# Patient Record
Sex: FEMALE | Race: BLACK OR AFRICAN AMERICAN | Employment: UNEMPLOYED | URBAN - METROPOLITAN AREA
[De-identification: names, ages, dates, MRNs, and addresses within clinical notes are randomized per-mention and may not be internally consistent; named-entity substitution may affect disease eponyms.]

---

## 2022-05-09 ENCOUNTER — HOSPITAL ENCOUNTER (EMERGENCY)
Age: 27
Discharge: HOME | End: 2022-05-09
Attending: STUDENT IN AN ORGANIZED HEALTH CARE EDUCATION/TRAINING PROGRAM

## 2022-05-09 ENCOUNTER — APPOINTMENT (OUTPATIENT)
Dept: ULTRASOUND IMAGING | Age: 27
End: 2022-05-09

## 2022-05-09 ENCOUNTER — APPOINTMENT (OUTPATIENT)
Dept: CT IMAGING | Age: 27
End: 2022-05-09

## 2022-05-09 VITALS
SYSTOLIC BLOOD PRESSURE: 128 MMHG | TEMPERATURE: 98 F | OXYGEN SATURATION: 100 % | RESPIRATION RATE: 18 BRPM | DIASTOLIC BLOOD PRESSURE: 70 MMHG | HEART RATE: 82 BPM

## 2022-05-09 DIAGNOSIS — R93.89 ENDOMETRIAL THICKENING ON ULTRASOUND: ICD-10-CM

## 2022-05-09 DIAGNOSIS — E28.2 POLYCYSTIC OVARIAN SYNDROME: Primary | ICD-10-CM

## 2022-05-09 LAB
ALBUMIN SERPL-MCNC: 4.4 GM/DL (ref 3.4–5)
ALP BLD-CCNC: 64 IU/L (ref 40–129)
ALT SERPL-CCNC: 27 U/L (ref 10–40)
ANION GAP SERPL CALCULATED.3IONS-SCNC: 11 MMOL/L (ref 4–16)
AST SERPL-CCNC: 21 IU/L (ref 15–37)
BACTERIA: NEGATIVE /HPF
BASOPHILS ABSOLUTE: 0.1 K/CU MM
BASOPHILS RELATIVE PERCENT: 0.7 % (ref 0–1)
BILIRUB SERPL-MCNC: 0.4 MG/DL (ref 0–1)
BILIRUBIN URINE: NEGATIVE MG/DL
BLOOD, URINE: NEGATIVE
BUN BLDV-MCNC: 8 MG/DL (ref 6–23)
CALCIUM IONIZED: 4.72 MG/DL (ref 4.48–5.28)
CALCIUM SERPL-MCNC: 9.4 MG/DL (ref 8.3–10.6)
CHLORIDE BLD-SCNC: 102 MMOL/L (ref 99–110)
CLARITY: ABNORMAL
CO2: 26 MMOL/L (ref 21–32)
COLOR: YELLOW
CREAT SERPL-MCNC: 0.6 MG/DL (ref 0.6–1.1)
DIFFERENTIAL TYPE: ABNORMAL
EOSINOPHILS ABSOLUTE: 0.1 K/CU MM
EOSINOPHILS RELATIVE PERCENT: 0.8 % (ref 0–3)
GFR AFRICAN AMERICAN: >60 ML/MIN/1.73M2
GFR NON-AFRICAN AMERICAN: >60 ML/MIN/1.73M2
GLUCOSE BLD-MCNC: 86 MG/DL (ref 70–99)
GLUCOSE, URINE: NEGATIVE MG/DL
HCT VFR BLD CALC: 44.2 % (ref 37–47)
HEMOGLOBIN: 14.9 GM/DL (ref 12.5–16)
IMMATURE NEUTROPHIL %: 0.3 % (ref 0–0.43)
INTERPRETATION: NORMAL
IONIZED CA: 1.18 MMOL/L (ref 1.12–1.32)
KETONES, URINE: NEGATIVE MG/DL
LEUKOCYTE ESTERASE, URINE: NEGATIVE
LIPASE: 43 IU/L (ref 13–60)
LYMPHOCYTES ABSOLUTE: 2 K/CU MM
LYMPHOCYTES RELATIVE PERCENT: 27.9 % (ref 24–44)
MCH RBC QN AUTO: 27.4 PG (ref 27–31)
MCHC RBC AUTO-ENTMCNC: 33.7 % (ref 32–36)
MCV RBC AUTO: 81.3 FL (ref 78–100)
MONOCYTES ABSOLUTE: 0.4 K/CU MM
MONOCYTES RELATIVE PERCENT: 5.8 % (ref 0–4)
MUCUS: ABNORMAL HPF
NITRITE URINE, QUANTITATIVE: NEGATIVE
NUCLEATED RBC %: 0 %
PDW BLD-RTO: 13.4 % (ref 11.7–14.9)
PH, URINE: 7.5 (ref 5–8)
PLATELET # BLD: 314 K/CU MM (ref 140–440)
PMV BLD AUTO: 12.5 FL (ref 7.5–11.1)
POTASSIUM SERPL-SCNC: 3.8 MMOL/L (ref 3.5–5.1)
PREGNANCY, URINE: NEGATIVE
PROTEIN UA: NEGATIVE MG/DL
RBC # BLD: 5.44 M/CU MM (ref 4.2–5.4)
RBC URINE: 1 /HPF (ref 0–6)
REASON FOR REJECTION: NORMAL
REJECTED TEST: NORMAL
SEGMENTED NEUTROPHILS ABSOLUTE COUNT: 4.6 K/CU MM
SEGMENTED NEUTROPHILS RELATIVE PERCENT: 64.5 % (ref 36–66)
SODIUM BLD-SCNC: 139 MMOL/L (ref 135–145)
SPECIFIC GRAVITY UA: 1.02 (ref 1–1.03)
SPECIFIC GRAVITY, URINE: 1.02 (ref 1–1.03)
SQUAMOUS EPITHELIAL: 10 /HPF
TOTAL IMMATURE NEUTOROPHIL: 0.02 K/CU MM
TOTAL NUCLEATED RBC: 0 K/CU MM
TOTAL PROTEIN: 7.9 GM/DL (ref 6.4–8.2)
TRICHOMONAS: ABNORMAL /HPF
UROBILINOGEN, URINE: 7.5 MG/DL (ref 0.2–1)
WBC # BLD: 7.2 K/CU MM (ref 4–10.5)
WBC UA: 1 /HPF (ref 0–5)

## 2022-05-09 PROCEDURE — 74177 CT ABD & PELVIS W/CONTRAST: CPT

## 2022-05-09 PROCEDURE — 76830 TRANSVAGINAL US NON-OB: CPT

## 2022-05-09 PROCEDURE — 81001 URINALYSIS AUTO W/SCOPE: CPT

## 2022-05-09 PROCEDURE — 99285 EMERGENCY DEPT VISIT HI MDM: CPT

## 2022-05-09 PROCEDURE — 82330 ASSAY OF CALCIUM: CPT

## 2022-05-09 PROCEDURE — 83690 ASSAY OF LIPASE: CPT

## 2022-05-09 PROCEDURE — 93975 VASCULAR STUDY: CPT

## 2022-05-09 PROCEDURE — 6360000002 HC RX W HCPCS: Performed by: STUDENT IN AN ORGANIZED HEALTH CARE EDUCATION/TRAINING PROGRAM

## 2022-05-09 PROCEDURE — 80053 COMPREHEN METABOLIC PANEL: CPT

## 2022-05-09 PROCEDURE — 81025 URINE PREGNANCY TEST: CPT

## 2022-05-09 PROCEDURE — 85025 COMPLETE CBC W/AUTO DIFF WBC: CPT

## 2022-05-09 PROCEDURE — 96374 THER/PROPH/DIAG INJ IV PUSH: CPT

## 2022-05-09 PROCEDURE — 6360000004 HC RX CONTRAST MEDICATION: Performed by: STUDENT IN AN ORGANIZED HEALTH CARE EDUCATION/TRAINING PROGRAM

## 2022-05-09 RX ORDER — KETOROLAC TROMETHAMINE 30 MG/ML
15 INJECTION, SOLUTION INTRAMUSCULAR; INTRAVENOUS ONCE
Status: COMPLETED | OUTPATIENT
Start: 2022-05-09 | End: 2022-05-09

## 2022-05-09 RX ADMIN — KETOROLAC TROMETHAMINE 15 MG: 30 INJECTION, SOLUTION INTRAMUSCULAR; INTRAVENOUS at 14:13

## 2022-05-09 RX ADMIN — IOPAMIDOL 75 ML: 755 INJECTION, SOLUTION INTRAVENOUS at 18:12

## 2022-05-09 NOTE — ED PROVIDER NOTES
Emergency Department Encounter    Patient: Diogo Moon  MRN: [id number]  : 1995  Date of Evaluation: 2022  ED Provider:  Shekhar Love MD    Triage Chief Complaint:   Abdominal Pain (lower abd pain. had miscarriage 3 months ago)    [place]:  Diogo Moon is a 33 y.o. female presenting with left lower quadrant abdominal discomfort. Patient states this has been going on since February. States over the past month left lower quadrant abdominal discomfort has become more constant and with worsening during periods. States her letters. Was the first of this month. Denies any current vaginal bleeding or discharge. States the pain currently is moderate, constant, stabbing, worse with palpation without relieving factors. Denies nausea vomiting, diarrhea constipation, blood or melena stools. Denies recent falls or trauma. Denies alcohol or drug use. Denies fevers or chills. Denies headache, blurred vision, focal neurodeficits, motor or sensory changes. Denies chest pain, shortness of breath, cough or sputum production. States she does not have a primary doctor and OB/GYN. ROS - see HPI, below listed is current ROS at time of my eval:  At least 14 systems reviewed, negative other HPI    No past medical history on file. No past surgical history on file. No family history on file.   Social History     Socioeconomic History    Marital status: Single     Spouse name: Not on file    Number of children: Not on file    Years of education: Not on file    Highest education level: Not on file   Occupational History    Not on file   Tobacco Use    Smoking status: Never Smoker    Smokeless tobacco: Not on file   Substance and Sexual Activity    Alcohol use: Never    Drug use: Never    Sexual activity: Not on file   Other Topics Concern    Not on file   Social History Narrative    Not on file     Social Determinants of Health     Financial Resource Strain:     Difficulty of Paying Living Expenses: Not on file   Food Insecurity:     Worried About Running Out of Food in the Last Year: Not on file    Ran Out of Food in the Last Year: Not on file   Transportation Needs:     Lack of Transportation (Medical): Not on file    Lack of Transportation (Non-Medical): Not on file   Physical Activity:     Days of Exercise per Week: Not on file    Minutes of Exercise per Session: Not on file   Stress:     Feeling of Stress : Not on file   Social Connections:     Frequency of Communication with Friends and Family: Not on file    Frequency of Social Gatherings with Friends and Family: Not on file    Attends Sabianism Services: Not on file    Active Member of [address] [place] [address] [employer] or Organizations: Not on file    Attends Club or Organization Meetings: Not on file    Marital Status: Not on file   Intimate Partner Violence:     Fear of Current or Ex-Partner: Not on file    Emotionally Abused: Not on file    Physically Abused: Not on file    Sexually Abused: Not on file   Housing Stability:     Unable to Pay for Housing in the Last Year: Not on file    Number of [place] in the Last Year: Not on file    Unstable Housing in the Last Year: Not on file     No current facility-administered medications for this encounter. No current outpatient medications on file. No Known Allergies    Nursing Notes Reviewed    Physical Exam:  Triage VS:    ED Triage Vitals   Enc Vitals Group      BP 05/09/22 0933 (!) 123/93      Pulse 05/09/22 0933 86      Resp 05/09/22 0933 18      Temp 05/09/22 0937 98.2 °F (36.8 °C)      Temp Source 05/09/22 0937 Oral      SpO2 05/09/22 0933 99 %      Weight --       Height --       Head Circumference --       Peak Flow --       Pain Score --       Pain Loc --       Pain Edu? --       Excl. in [address]? --        My pulse ox interpretation is - normal    General appearance:  No acute distress. Skin:  Warm. Dry. Eye:  Extraocular movements intact.      Ears, nose, mouth and throat:  Oral mucosa moist   Neck:  Trachea midline. Extremity:  No swelling. Normal ROM     Heart:  Regular rate and rhythm, normal S1 & S2, no extra heart sounds. Perfusion:  intact  Respiratory:  Lungs clear to auscultation bilaterally. Respirations nonlabored. Abdominal:  Normal bowel sounds. Soft. Mild discomfort in the left lower quadrant without rebound or guarding, no CVA tenderness, no central spinal tenderness  Back:  No CVA tenderness to palpation     Neurological:  Alert and oriented times 3. No focal neuro deficits.              Psychiatric:  Appropriate    I have reviewed and interpreted all of the currently available lab results from this visit (if applicable):  Results for orders placed or performed during the hospital encounter of 05/09/22   CBC with Auto Differential   Result Value Ref Range    WBC 7.2 4.0 - 10.5 K/CU MM    RBC 5.44 (H) 4.2 - 5.4 M/CU MM    Hemoglobin 14.9 12.5 - 16.0 GM/DL    Hematocrit 44.2 37 - 47 %    MCV 81.3 78 - 100 FL    MCH 27.4 27 - 31 PG    MCHC 33.7 32.0 - 36.0 %    RDW 13.4 11.7 - 14.9 %    Platelets 634 570 - 280 K/CU MM    MPV 12.5 (H) 7.5 - 11.1 FL    Differential Type AUTOMATED DIFFERENTIAL     Segs Relative 64.5 36 - 66 %    Lymphocytes % 27.9 24 - 44 %    Monocytes % 5.8 (H) 0 - 4 %    Eosinophils % 0.8 0 - 3 %    Basophils % 0.7 0 - 1 %    Segs Absolute 4.6 K/CU MM    Lymphocytes Absolute 2.0 K/CU MM    Monocytes Absolute 0.4 K/CU MM    Eosinophils Absolute 0.1 K/CU MM    Basophils Absolute 0.1 K/CU MM    Nucleated RBC % 0.0 %    Total Nucleated RBC 0.0 K/CU MM    Total Immature Neutrophil 0.02 K/CU MM    Immature Neutrophil % 0.3 0 - 0.43 %   Urinalysis   Result Value Ref Range    Color, UA YELLOW YELLOW    Clarity, UA SLIGHTLY CLOUDY (A) CLEAR    Glucose, Urine NEGATIVE NEGATIVE MG/DL    Bilirubin Urine NEGATIVE NEGATIVE MG/DL    Ketones, Urine NEGATIVE NEGATIVE MG/DL    Specific Gravity, UA 1.020 1.001 - 1.035    Blood, Urine NEGATIVE NEGATIVE pH, Urine 7.5 5.0 - 8.0    Protein, UA NEGATIVE NEGATIVE MG/DL    Urobilinogen, Urine 7.5 (H) 0.2 - 1.0 MG/DL    Nitrite Urine, Quantitative NEGATIVE NEGATIVE    Leukocyte Esterase, Urine NEGATIVE NEGATIVE    RBC, UA 1 0 - 6 /HPF    WBC, UA 1 0 - 5 /HPF    Bacteria, UA NEGATIVE NEGATIVE /HPF    Squam Epithel, UA 10 /HPF    Mucus, UA RARE (A) NEGATIVE HPF    Trichomonas, UA NONE SEEN NONE SEEN /HPF   SPECIMEN REJECTION   Result Value Ref Range    Rejected Test CMPR     Reason for Rejection UNABLE TO PERFORM TESTING:    HCG, Urine, Qualitative, Pregnancy (Lab)   Result Value Ref Range    Pregnancy, Urine NEGATIVE NEGATIVE    Specific Gravity, Urine 1.020 1.001 - 1.035    Interpretation HCG METHOD LIMITATIONS:       Radiographs (if obtained):  Radiologist's Report Reviewed:  No results found. MDM:    44-year-old female presenting with left lower quadrant abdominal discomfort over the past 3 months started her menstrual periods. History missing above. Vitals on presentation are reassuring and patient afebrile satting on room air. Physical exam patient has discomfort in the left lower quadrant without rebound or guarding. preg testing is negative UA not consistent with infection. CBC is reassuring without leukocytosis and overall reassuring. Initial CMP was concerning for hypercalcemia but lab draw was bad so a repeat CMP was obtained as well as ionized calcium. CMP is reassuring with all labs within normal limits and ionized calcium is within normal limits. Lipase is nonelevated. CT abdomen pelvis reveals no acute findings in the abdomen pelvis. There is mild stool load in the rectosigmoid colon. Her bilateral ovaries are prominent in size. Transvaginal ultrasound was obtained revealing bilateral ovaries enlarged and contain peripheral follicles with central echogenicity consistent with polycystic ovarian syndrome.   There is significantly thickened endometrial lining measuring 21 mm with no endometrial mass lesion findings may be related to endometrial hyperplasia. Patient denies any vaginal bleeding or discharge, fevers or chills or infectious symptoms. Discussed findings with patient. Patient's pain is improved on reevaluation. Patient overall well-appearing and vitals are reassuring. Believe patient is safe for discharge at this time with follow-up with gynecology as well as primary care physician. Discussed the importance of following up with gynecology and patient agreeable to plan. Discussed tricked return precautions patient discharged home. Clinical Impression:  1. Polycystic ovarian syndrome    2. Endometrial thickening on ultrasound          Comment: Please note this report has been produced using speech recognition software and may contain errors related to that system including errors in grammar, punctuation, and spelling, as well as words and phrases that may be inappropriate. Efforts were made to edit the dictations.         Dmitri Mcmillan MD  05/10/22 1375

## 2022-05-09 NOTE — CARE COORDINATION
CM consult per Dr Neeraj Hein to assist with discharge instructions. Pt to ER today for left sided pt x 3 mo. CM asked to provide OBGYN o/p resources to pt. Review of pt chart pt has no SS#, no insurance. CM provided o/p [facility] group for o/p follow up from today's visit. POC is for discharge with o/p follow up.  ISAIASRn/CM

## 2022-05-23 ENCOUNTER — OFFICE VISIT (OUTPATIENT)
Dept: OBGYN | Age: 27
End: 2022-05-23

## 2022-05-23 VITALS — WEIGHT: 178 LBS | DIASTOLIC BLOOD PRESSURE: 82 MMHG | SYSTOLIC BLOOD PRESSURE: 130 MMHG

## 2022-05-23 DIAGNOSIS — Z12.4 SCREENING FOR MALIGNANT NEOPLASM OF CERVIX: Primary | ICD-10-CM

## 2022-05-23 DIAGNOSIS — N92.6 IRREGULAR MENSES: ICD-10-CM

## 2022-05-23 DIAGNOSIS — N94.6 DYSMENORRHEA: ICD-10-CM

## 2022-05-23 DIAGNOSIS — Z01.419 ENCOUNTER FOR WELL WOMAN EXAM WITH ROUTINE GYNECOLOGICAL EXAM: ICD-10-CM

## 2022-05-23 DIAGNOSIS — R93.89 ENDOMETRIAL THICKENING ON ULTRASOUND: ICD-10-CM

## 2022-05-23 DIAGNOSIS — E28.2 PCOS (POLYCYSTIC OVARIAN SYNDROME): ICD-10-CM

## 2022-05-23 PROCEDURE — 99385 PREV VISIT NEW AGE 18-39: CPT | Performed by: OBSTETRICS & GYNECOLOGY

## 2022-05-24 ENCOUNTER — HOSPITAL ENCOUNTER (OUTPATIENT)
Age: 27
Setting detail: SPECIMEN
Discharge: HOME | End: 2022-05-24

## 2022-05-24 LAB
CANDIDA SPECIES, DNA PROBE: ABNORMAL
GARDNERELLA VAGINALIS, DNA PROBE: ABNORMAL
TRICHOMONAS VAGINALIS DNA: ABNORMAL

## 2022-05-24 PROCEDURE — 88142 CYTOPATH C/V THIN LAYER: CPT

## 2022-05-24 PROCEDURE — 87801 DETECT AGNT MULT DNA AMPLI: CPT

## 2022-05-24 RX ORDER — METRONIDAZOLE 500 MG/1
500 TABLET ORAL 2 TIMES DAILY
Qty: 14 TABLET | Refills: 0 | Status: SHIPPED | OUTPATIENT
Start: 2022-05-24 | End: 2022-05-31

## 2022-05-27 LAB
CHLAMYDIA BY THIN PREP: NEGATIVE
GC BY THIN PREP: NEGATIVE

## 2022-06-06 ENCOUNTER — OFFICE VISIT (OUTPATIENT)
Dept: OBGYN | Age: 27
End: 2022-06-06

## 2022-06-06 VITALS
HEIGHT: 67 IN | WEIGHT: 175 LBS | BODY MASS INDEX: 27.47 KG/M2 | SYSTOLIC BLOOD PRESSURE: 126 MMHG | DIASTOLIC BLOOD PRESSURE: 87 MMHG

## 2022-06-06 DIAGNOSIS — B96.89 BACTERIAL VAGINOSIS: ICD-10-CM

## 2022-06-06 DIAGNOSIS — N76.0 BACTERIAL VAGINOSIS: ICD-10-CM

## 2022-06-06 DIAGNOSIS — R93.89 ENDOMETRIAL THICKENING ON ULTRASOUND: ICD-10-CM

## 2022-06-06 DIAGNOSIS — N94.6 DYSMENORRHEA: Primary | ICD-10-CM

## 2022-06-06 PROCEDURE — 99214 OFFICE O/P EST MOD 30 MIN: CPT | Performed by: OBSTETRICS & GYNECOLOGY

## 2022-06-06 RX ORDER — METRONIDAZOLE 500 MG/1
500 TABLET ORAL 2 TIMES DAILY
Qty: 14 TABLET | Refills: 0 | Status: SHIPPED | OUTPATIENT
Start: 2022-06-06 | End: 2022-06-13

## 2022-06-06 SDOH — ECONOMIC STABILITY: FOOD INSECURITY: WITHIN THE PAST 12 MONTHS, THE FOOD YOU BOUGHT JUST DIDN'T LAST AND YOU DIDN'T HAVE MONEY TO GET MORE.: NEVER TRUE

## 2022-06-06 SDOH — ECONOMIC STABILITY: TRANSPORTATION INSECURITY
IN THE PAST 12 MONTHS, HAS LACK OF TRANSPORTATION KEPT YOU FROM MEETINGS, WORK, OR FROM GETTING THINGS NEEDED FOR DAILY LIVING?: NO

## 2022-06-06 SDOH — ECONOMIC STABILITY: FOOD INSECURITY: WITHIN THE PAST 12 MONTHS, YOU WORRIED THAT YOUR FOOD WOULD RUN OUT BEFORE YOU GOT MONEY TO BUY MORE.: NEVER TRUE

## 2022-06-06 SDOH — ECONOMIC STABILITY: TRANSPORTATION INSECURITY
IN THE PAST 12 MONTHS, HAS THE LACK OF TRANSPORTATION KEPT YOU FROM MEDICAL APPOINTMENTS OR FROM GETTING MEDICATIONS?: NO

## 2022-06-06 ASSESSMENT — PATIENT HEALTH QUESTIONNAIRE - PHQ9
2. FEELING DOWN, DEPRESSED OR HOPELESS: 0
1. LITTLE INTEREST OR PLEASURE IN DOING THINGS: 0
SUM OF ALL RESPONSES TO PHQ QUESTIONS 1-9: 0
SUM OF ALL RESPONSES TO PHQ9 QUESTIONS 1 & 2: 0
SUM OF ALL RESPONSES TO PHQ QUESTIONS 1-9: 0

## 2022-06-06 ASSESSMENT — SOCIAL DETERMINANTS OF HEALTH (SDOH): HOW HARD IS IT FOR YOU TO PAY FOR THE VERY BASICS LIKE FOOD, HOUSING, MEDICAL CARE, AND HEATING?: NOT HARD AT ALL

## 2022-06-06 NOTE — PROGRESS NOTES
6/6/22    [facility]  1995       Chief Complaint   Patient presents with    Pelvic Pain     Pt no showed ultrasound and here for follow. Pt still c/o abd pain on left side. Pt did test positive for BV, informed         Valdez Bullard is a 33 y.o. female who presents today for evaluation of complaints as above    Past Medical History:   Diagnosis Date    Abdominal pain     Dysmenorrhea     Endometrial thickening on ultrasound     Fatigue     Irregular menses     PCOS (polycystic ovarian syndrome)        No past surgical history on file. Social History     Tobacco Use    Smoking status: Never Smoker    Smokeless tobacco: Not on file   Substance Use Topics    Alcohol use: Never    Drug use: Never       No family history on file. Current Outpatient Medications   Medication Sig Dispense Refill    metroNIDAZOLE (FLAGYL) 500 MG tablet Take 1 tablet by mouth 2 times daily for 7 days 14 tablet 0     No current facility-administered medications for this visit. No Known Allergies    No obstetric history on file. There is no immunization history on file for this patient. Review of Systems  All other systems reviewed and are negative    /87   Ht 5' 7\" (1.702 m)   Wt 175 lb (79.4 kg)   BMI 27.41 kg/m²     Physical Exam    No results found for this visit on 06/06/22. ASSESSMENT AND PLAN   Diagnosis Orders   1. Dysmenorrhea     2. Endometrial thickening on ultrasound     3. Bacterial vaginosis     Flagyl for her bacterial vaginosis. She will also reschedule her ultrasound which she missed because she could not find her. Return in about 1 week (around 6/13/2022) for ultrasound, follow up appointment.     Carolyn Henry MD

## 2022-06-17 NOTE — PROGRESS NOTES
6/17/22    [facility]  1995    Chief Complaint   Patient presents with    Follow-up     pt here for er f/u, states went to ED 5/9/2022 for abdominal pain, u/s and labs done, instructed to f/u with gyn for endometrial thickening on u/s. continues to hsve abdominal pain and fatigue. Oren Paris is a 33 y.o. female who presents today for evaluation of complaints as above    Past Medical History:   Diagnosis Date    Abdominal pain     Dysmenorrhea     Endometrial thickening on ultrasound     Fatigue     Irregular menses     PCOS (polycystic ovarian syndrome)        No past surgical history on file. Social History     Tobacco Use    Smoking status: Never Smoker    Smokeless tobacco: Not on file   Substance Use Topics    Alcohol use: Never    Drug use: Never       No family history on file. No current outpatient medications on file. No current facility-administered medications for this visit. No Known Allergies    No obstetric history on file. There is no immunization history on file for this patient. Review of Systems  All other systems reviewed and are negative    /82   Wt 178 lb (80.7 kg)   LMP 04/29/2022     Physical Exam  Nursing note reviewed. Exam conducted with a chaperone present. HENT:      Head: Normocephalic. Nose: Nose normal.   Eyes:      Extraocular Movements: Extraocular movements intact. Pulmonary:      Effort: Pulmonary effort is normal.   Abdominal:      General: Abdomen is flat. Palpations: Abdomen is soft. Hernia: There is no hernia in the left inguinal area or right inguinal area. Genitourinary:     General: Normal vulva. Exam position: Lithotomy position. Pubic Area: No rash or pubic lice. Labia:         Right: No rash, tenderness, lesion or injury. Left: No rash, tenderness, lesion or injury. Urethra: No prolapse, urethral pain, urethral swelling or urethral lesion.